# Patient Record
Sex: MALE | Race: BLACK OR AFRICAN AMERICAN | NOT HISPANIC OR LATINO | Employment: UNEMPLOYED | URBAN - METROPOLITAN AREA
[De-identification: names, ages, dates, MRNs, and addresses within clinical notes are randomized per-mention and may not be internally consistent; named-entity substitution may affect disease eponyms.]

---

## 2023-07-06 PROBLEM — M62.81 MUSCLE WEAKNESS OF UPPER EXTREMITY: Status: ACTIVE | Noted: 2023-07-06

## 2023-07-06 PROBLEM — I49.1 PAC (PREMATURE ATRIAL CONTRACTION): Status: ACTIVE | Noted: 2023-07-06

## 2023-07-06 PROBLEM — R53.83 FATIGUE: Status: ACTIVE | Noted: 2023-07-06

## 2023-07-06 PROBLEM — R25.1 TREMOR: Status: ACTIVE | Noted: 2023-07-06

## 2023-07-06 PROBLEM — R55 SYNCOPAL EPISODES: Status: ACTIVE | Noted: 2023-07-06

## 2023-07-06 PROBLEM — E55.9 VITAMIN D DEFICIENCY: Status: ACTIVE | Noted: 2023-07-06

## 2023-07-06 PROBLEM — K02.9 PAIN DUE TO DENTAL CARIES: Status: ACTIVE | Noted: 2023-07-06

## 2023-07-06 PROBLEM — R31.29 PERSISTENT MICROSCOPIC HEMATURIA: Status: ACTIVE | Noted: 2023-07-06

## 2023-07-06 PROBLEM — R42 DIZZINESS: Status: ACTIVE | Noted: 2023-07-06

## 2023-07-06 PROBLEM — G56.03 BILATERAL CARPAL TUNNEL SYNDROME: Status: ACTIVE | Noted: 2023-07-06

## 2023-07-06 PROBLEM — I73.9 PAD (PERIPHERAL ARTERY DISEASE) (CMS-HCC): Status: ACTIVE | Noted: 2023-07-06

## 2023-07-06 PROBLEM — R42 VERTIGO: Status: ACTIVE | Noted: 2023-07-06

## 2023-07-06 PROBLEM — F17.210 NICOTINE DEPENDENCE, CIGARETTES, UNCOMPLICATED: Status: ACTIVE | Noted: 2023-07-06

## 2023-07-06 PROBLEM — B19.20 HEPATITIS C: Status: ACTIVE | Noted: 2023-07-06

## 2023-07-06 PROBLEM — R06.02 SHORTNESS OF BREATH: Status: ACTIVE | Noted: 2023-07-06

## 2024-01-21 ENCOUNTER — HOSPITAL ENCOUNTER (OUTPATIENT)
Facility: HOSPITAL | Age: 64
Setting detail: OBSERVATION
Discharge: HOME | End: 2024-01-22
Attending: EMERGENCY MEDICINE | Admitting: EMERGENCY MEDICINE
Payer: COMMERCIAL

## 2024-01-21 ENCOUNTER — APPOINTMENT (OUTPATIENT)
Dept: RADIOLOGY | Facility: HOSPITAL | Age: 64
End: 2024-01-21
Payer: COMMERCIAL

## 2024-01-21 ENCOUNTER — CLINICAL SUPPORT (OUTPATIENT)
Dept: EMERGENCY MEDICINE | Facility: HOSPITAL | Age: 64
End: 2024-01-21
Payer: COMMERCIAL

## 2024-01-21 ENCOUNTER — APPOINTMENT (OUTPATIENT)
Dept: NEUROLOGY | Facility: HOSPITAL | Age: 64
End: 2024-01-21
Payer: COMMERCIAL

## 2024-01-21 DIAGNOSIS — R56.9 SEIZURE (MULTI): Primary | ICD-10-CM

## 2024-01-21 LAB
ALBUMIN SERPL BCP-MCNC: 3.9 G/DL (ref 3.4–5)
ALP SERPL-CCNC: 80 U/L (ref 33–136)
ALT SERPL W P-5'-P-CCNC: 25 U/L (ref 10–52)
AMPHETAMINES UR QL SCN: ABNORMAL
ANION GAP BLDV CALCULATED.4IONS-SCNC: 14 MMOL/L (ref 10–25)
ANION GAP BLDV CALCULATED.4IONS-SCNC: 9 MMOL/L (ref 10–25)
ANION GAP SERPL CALC-SCNC: 15 MMOL/L (ref 10–20)
APAP SERPL-MCNC: <10 UG/ML
APPEARANCE UR: CLEAR
AST SERPL W P-5'-P-CCNC: 18 U/L (ref 9–39)
BACTERIA #/AREA URNS AUTO: ABNORMAL /HPF
BARBITURATES UR QL SCN: ABNORMAL
BASE EXCESS BLDV CALC-SCNC: -2.6 MMOL/L (ref -2–3)
BASE EXCESS BLDV CALC-SCNC: -2.6 MMOL/L (ref -2–3)
BASOPHILS # BLD AUTO: 0.02 X10*3/UL (ref 0–0.1)
BASOPHILS NFR BLD AUTO: 0.4 %
BENZODIAZ UR QL SCN: ABNORMAL
BILIRUB SERPL-MCNC: 0.7 MG/DL (ref 0–1.2)
BILIRUB UR STRIP.AUTO-MCNC: NEGATIVE MG/DL
BODY TEMPERATURE: 37 DEGREES CELSIUS
BODY TEMPERATURE: 37 DEGREES CELSIUS
BUN SERPL-MCNC: 15 MG/DL (ref 6–23)
BZE UR QL SCN: ABNORMAL
CA-I BLDV-SCNC: 1.11 MMOL/L (ref 1.1–1.33)
CA-I BLDV-SCNC: 1.23 MMOL/L (ref 1.1–1.33)
CALCIUM SERPL-MCNC: 9.2 MG/DL (ref 8.6–10.6)
CANNABINOIDS UR QL SCN: ABNORMAL
CHLORIDE BLDV-SCNC: 103 MMOL/L (ref 98–107)
CHLORIDE BLDV-SCNC: 106 MMOL/L (ref 98–107)
CHLORIDE SERPL-SCNC: 106 MMOL/L (ref 98–107)
CO2 SERPL-SCNC: 23 MMOL/L (ref 21–32)
COLOR UR: YELLOW
CREAT SERPL-MCNC: 0.98 MG/DL (ref 0.5–1.3)
EGFRCR SERPLBLD CKD-EPI 2021: 86 ML/MIN/1.73M*2
EOSINOPHIL # BLD AUTO: 0.04 X10*3/UL (ref 0–0.7)
EOSINOPHIL NFR BLD AUTO: 0.7 %
ERYTHROCYTE [DISTWIDTH] IN BLOOD BY AUTOMATED COUNT: 13.7 % (ref 11.5–14.5)
ETHANOL SERPL-MCNC: <10 MG/DL
FENTANYL+NORFENTANYL UR QL SCN: ABNORMAL
GLUCOSE BLDV-MCNC: 83 MG/DL (ref 74–99)
GLUCOSE BLDV-MCNC: 97 MG/DL (ref 74–99)
GLUCOSE SERPL-MCNC: 82 MG/DL (ref 74–99)
GLUCOSE UR STRIP.AUTO-MCNC: NEGATIVE MG/DL
HCO3 BLDV-SCNC: 24.6 MMOL/L (ref 22–26)
HCO3 BLDV-SCNC: 24.6 MMOL/L (ref 22–26)
HCT VFR BLD AUTO: 49.6 % (ref 41–52)
HCT VFR BLD EST: 50 % (ref 41–52)
HCT VFR BLD EST: 52 % (ref 41–52)
HGB BLD-MCNC: 16.7 G/DL (ref 13.5–17.5)
HGB BLDV-MCNC: 16.5 G/DL (ref 13.5–17.5)
HGB BLDV-MCNC: 17.3 G/DL (ref 13.5–17.5)
IMM GRANULOCYTES # BLD AUTO: 0.02 X10*3/UL (ref 0–0.7)
IMM GRANULOCYTES NFR BLD AUTO: 0.4 % (ref 0–0.9)
INHALED O2 CONCENTRATION: 21 %
KETONES UR STRIP.AUTO-MCNC: NEGATIVE MG/DL
LACTATE BLDV-SCNC: 2 MMOL/L (ref 0.4–2)
LACTATE BLDV-SCNC: 4.6 MMOL/L (ref 0.4–2)
LEUKOCYTE ESTERASE UR QL STRIP.AUTO: NEGATIVE
LEVETIRACETAM SERPL-MCNC: <2 UG/ML (ref 10–40)
LYMPHOCYTES # BLD AUTO: 1.72 X10*3/UL (ref 1.2–4.8)
LYMPHOCYTES NFR BLD AUTO: 31.5 %
MCH RBC QN AUTO: 31.5 PG (ref 26–34)
MCHC RBC AUTO-ENTMCNC: 33.7 G/DL (ref 32–36)
MCV RBC AUTO: 94 FL (ref 80–100)
MONOCYTES # BLD AUTO: 0.69 X10*3/UL (ref 0.1–1)
MONOCYTES NFR BLD AUTO: 12.6 %
MUCOUS THREADS #/AREA URNS AUTO: ABNORMAL /LPF
NEUTROPHILS # BLD AUTO: 2.97 X10*3/UL (ref 1.2–7.7)
NEUTROPHILS NFR BLD AUTO: 54.4 %
NITRITE UR QL STRIP.AUTO: NEGATIVE
NRBC BLD-RTO: 0 /100 WBCS (ref 0–0)
OPIATES UR QL SCN: ABNORMAL
OXYCODONE+OXYMORPHONE UR QL SCN: ABNORMAL
OXYHGB MFR BLDV: 41.9 % (ref 45–75)
OXYHGB MFR BLDV: 48.5 % (ref 45–75)
PCO2 BLDV: 50 MM HG (ref 41–51)
PCO2 BLDV: 50 MM HG (ref 41–51)
PCP UR QL SCN: ABNORMAL
PH BLDV: 7.3 PH (ref 7.33–7.43)
PH BLDV: 7.3 PH (ref 7.33–7.43)
PH UR STRIP.AUTO: 5 [PH]
PLATELET # BLD AUTO: 200 X10*3/UL (ref 150–450)
PO2 BLDV: 33 MM HG (ref 35–45)
PO2 BLDV: 35 MM HG (ref 35–45)
POTASSIUM BLDV-SCNC: 3.8 MMOL/L (ref 3.5–5.3)
POTASSIUM BLDV-SCNC: 4.2 MMOL/L (ref 3.5–5.3)
POTASSIUM SERPL-SCNC: 4.1 MMOL/L (ref 3.5–5.3)
PROT SERPL-MCNC: 7.4 G/DL (ref 6.4–8.2)
PROT UR STRIP.AUTO-MCNC: ABNORMAL MG/DL
RBC # BLD AUTO: 5.3 X10*6/UL (ref 4.5–5.9)
RBC # UR STRIP.AUTO: ABNORMAL /UL
RBC #/AREA URNS AUTO: ABNORMAL /HPF
SALICYLATES SERPL-MCNC: <3 MG/DL
SAO2 % BLDV: 44 % (ref 45–75)
SAO2 % BLDV: 51 % (ref 45–75)
SODIUM BLDV-SCNC: 136 MMOL/L (ref 136–145)
SODIUM BLDV-SCNC: 137 MMOL/L (ref 136–145)
SODIUM SERPL-SCNC: 140 MMOL/L (ref 136–145)
SP GR UR STRIP.AUTO: 1.02
UROBILINOGEN UR STRIP.AUTO-MCNC: 2 MG/DL
WBC # BLD AUTO: 5.5 X10*3/UL (ref 4.4–11.3)
WBC #/AREA URNS AUTO: ABNORMAL /HPF

## 2024-01-21 PROCEDURE — 96361 HYDRATE IV INFUSION ADD-ON: CPT

## 2024-01-21 PROCEDURE — G0378 HOSPITAL OBSERVATION PER HR: HCPCS

## 2024-01-21 PROCEDURE — 84132 ASSAY OF SERUM POTASSIUM: CPT

## 2024-01-21 PROCEDURE — 96360 HYDRATION IV INFUSION INIT: CPT

## 2024-01-21 PROCEDURE — 2500000004 HC RX 250 GENERAL PHARMACY W/ HCPCS (ALT 636 FOR OP/ED): Mod: SE | Performed by: EMERGENCY MEDICINE

## 2024-01-21 PROCEDURE — 70450 CT HEAD/BRAIN W/O DYE: CPT | Performed by: STUDENT IN AN ORGANIZED HEALTH CARE EDUCATION/TRAINING PROGRAM

## 2024-01-21 PROCEDURE — 85025 COMPLETE CBC W/AUTO DIFF WBC: CPT | Performed by: EMERGENCY MEDICINE

## 2024-01-21 PROCEDURE — 71045 X-RAY EXAM CHEST 1 VIEW: CPT

## 2024-01-21 PROCEDURE — 99285 EMERGENCY DEPT VISIT HI MDM: CPT | Performed by: EMERGENCY MEDICINE

## 2024-01-21 PROCEDURE — 93005 ELECTROCARDIOGRAM TRACING: CPT

## 2024-01-21 PROCEDURE — 80177 DRUG SCRN QUAN LEVETIRACETAM: CPT | Performed by: EMERGENCY MEDICINE

## 2024-01-21 PROCEDURE — 80143 DRUG ASSAY ACETAMINOPHEN: CPT

## 2024-01-21 PROCEDURE — 70450 CT HEAD/BRAIN W/O DYE: CPT

## 2024-01-21 PROCEDURE — 72125 CT NECK SPINE W/O DYE: CPT | Performed by: STUDENT IN AN ORGANIZED HEALTH CARE EDUCATION/TRAINING PROGRAM

## 2024-01-21 PROCEDURE — 81001 URINALYSIS AUTO W/SCOPE: CPT | Mod: 59 | Performed by: EMERGENCY MEDICINE

## 2024-01-21 PROCEDURE — 36415 COLL VENOUS BLD VENIPUNCTURE: CPT

## 2024-01-21 PROCEDURE — 70553 MRI BRAIN STEM W/O & W/DYE: CPT | Performed by: RADIOLOGY

## 2024-01-21 PROCEDURE — 72125 CT NECK SPINE W/O DYE: CPT

## 2024-01-21 PROCEDURE — 71045 X-RAY EXAM CHEST 1 VIEW: CPT | Performed by: STUDENT IN AN ORGANIZED HEALTH CARE EDUCATION/TRAINING PROGRAM

## 2024-01-21 PROCEDURE — 95816 EEG AWAKE AND DROWSY: CPT

## 2024-01-21 PROCEDURE — 99221 1ST HOSP IP/OBS SF/LOW 40: CPT

## 2024-01-21 PROCEDURE — 80053 COMPREHEN METABOLIC PANEL: CPT | Performed by: EMERGENCY MEDICINE

## 2024-01-21 PROCEDURE — A9575 INJ GADOTERATE MEGLUMI 0.1ML: HCPCS | Mod: SE | Performed by: EMERGENCY MEDICINE

## 2024-01-21 PROCEDURE — 95816 EEG AWAKE AND DROWSY: CPT | Performed by: PSYCHIATRY & NEUROLOGY

## 2024-01-21 PROCEDURE — 2500000004 HC RX 250 GENERAL PHARMACY W/ HCPCS (ALT 636 FOR OP/ED): Mod: SE

## 2024-01-21 PROCEDURE — 36415 COLL VENOUS BLD VENIPUNCTURE: CPT | Performed by: EMERGENCY MEDICINE

## 2024-01-21 PROCEDURE — 80307 DRUG TEST PRSMV CHEM ANLYZR: CPT | Performed by: EMERGENCY MEDICINE

## 2024-01-21 PROCEDURE — 70553 MRI BRAIN STEM W/O & W/DYE: CPT

## 2024-01-21 PROCEDURE — 2550000001 HC RX 255 CONTRASTS: Mod: SE | Performed by: EMERGENCY MEDICINE

## 2024-01-21 RX ORDER — GADOTERATE MEGLUMINE 376.9 MG/ML
17 INJECTION INTRAVENOUS
Status: COMPLETED | OUTPATIENT
Start: 2024-01-21 | End: 2024-01-21

## 2024-01-21 RX ORDER — ACETAMINOPHEN 325 MG/1
650 TABLET ORAL ONCE
Status: COMPLETED | OUTPATIENT
Start: 2024-01-21 | End: 2024-01-21

## 2024-01-21 RX ADMIN — SODIUM CHLORIDE, SODIUM LACTATE, POTASSIUM CHLORIDE, AND CALCIUM CHLORIDE 1000 ML: 600; 310; 30; 20 INJECTION, SOLUTION INTRAVENOUS at 13:15

## 2024-01-21 RX ADMIN — GADOTERATE MEGLUMINE 17 ML: 376.9 INJECTION INTRAVENOUS at 23:27

## 2024-01-21 RX ADMIN — SODIUM CHLORIDE, POTASSIUM CHLORIDE, SODIUM LACTATE AND CALCIUM CHLORIDE 1000 ML: 600; 310; 30; 20 INJECTION, SOLUTION INTRAVENOUS at 15:18

## 2024-01-21 RX ADMIN — ACETAMINOPHEN 650 MG: 325 TABLET ORAL at 23:34

## 2024-01-21 ASSESSMENT — COLUMBIA-SUICIDE SEVERITY RATING SCALE - C-SSRS
6. HAVE YOU EVER DONE ANYTHING, STARTED TO DO ANYTHING, OR PREPARED TO DO ANYTHING TO END YOUR LIFE?: NO
1. IN THE PAST MONTH, HAVE YOU WISHED YOU WERE DEAD OR WISHED YOU COULD GO TO SLEEP AND NOT WAKE UP?: NO
2. HAVE YOU ACTUALLY HAD ANY THOUGHTS OF KILLING YOURSELF?: NO

## 2024-01-21 ASSESSMENT — PAIN DESCRIPTION - PAIN TYPE: TYPE: ACUTE PAIN

## 2024-01-21 ASSESSMENT — PAIN - FUNCTIONAL ASSESSMENT
PAIN_FUNCTIONAL_ASSESSMENT: 0-10
PAIN_FUNCTIONAL_ASSESSMENT: 0-10

## 2024-01-21 ASSESSMENT — PAIN DESCRIPTION - LOCATION: LOCATION: OTHER (COMMENT)

## 2024-01-21 ASSESSMENT — PAIN DESCRIPTION - PROGRESSION: CLINICAL_PROGRESSION: GRADUALLY WORSENING

## 2024-01-21 ASSESSMENT — PAIN SCALES - GENERAL: PAINLEVEL_OUTOF10: 8

## 2024-01-21 NOTE — H&P
History and Physical  [facility] EMERGENCY MEDICINE  Patient: Chidi Wright  MRN: [id number]  : 1960  Date of Evaluation: 2024  ED Provider: YUNIEL Venegas, ROX      Limitations to history: None  Independent Historian: Yes  External Records Reviewed: [facility] in December       Patient History:  Chidi Wright is a 63 y.o. male with past medical history significant for polysubstance abuse presented to the ED today after he apparently had a seizure and was found by his landlord on the floor and incontinence of urine.    He had a similar episode in December and was taken to [facility] and was subsequently discharged after he had a negative work up and was instructed to follow up with neurology but he never did.   His labs in the ED showed no acute abnormalities with an initial lactate of 4.6 with a repeat of 2.   He has moderate amount of blood in his urine with 1+ bacteria but no subjective complaints.   He was evaluated by neurology who recommended an MRI and an EEG (both ordered) before they make any recommendations.  He is therefore admitted to the CDU to complete his recommended work up.   Patient denied any drug or alcohol use however he is positive for marijuana and cocaine. Alcohol level of less than 10.     The acute evaluation included:  Orders Placed This Encounter   Procedures    XR chest 1 view    CT head wo IV contrast    CT cervical spine wo IV contrast    MR brain w and wo IV contrast    Comprehensive metabolic panel    CBC and Auto Differential    Levetiracetam    Extra Tubes    Blood Gas Lactic Acid, Venous    Acute Toxicology Panel, Blood    BLOOD GAS VENOUS FULL PANEL    Urinalysis with Reflex Microscopic    Drug Screen, Urine    Microscopic Only, Urine    Adult diet Regular    Inpatient consult to neurology    ECG 12 lead    EEG    Send to CDU    Initiate observation status       Upon admission to the Clinical Decision Unit, 24 @ 1635    Past History      Past Medical History:   Diagnosis Date    Personal history of pulmonary embolism 12/19/2019    History of pulmonary embolism     No past surgical history on file.  Social History     Socioeconomic History    Marital status: Single     Spouse name: Not on file    Number of children: Not on file    Years of education: Not on file    Highest education level: Not on file   Occupational History    Not on file   Tobacco Use    Smoking status: Not on file    Smokeless tobacco: Not on file   Substance and Sexual Activity    Alcohol use: Not on file    Drug use: Not on file    Sexual activity: Not on file   Other Topics Concern    Not on file   Social History Narrative    Not on file     Social Determinants of Health     Financial Resource Strain: Not on file   Food Insecurity: Not on file   Transportation Needs: Not on file   Physical Activity: Not on file   Stress: Not on file   Social Connections: Not on file   Intimate Partner Violence: Not on file   Housing Stability: Not on file         Medications/Allergies     Previous Medications    ACETAMINOPHEN (TYLENOL EXTRA STRENGTH) 500 MG TABLET    Take by mouth.    ASPIRIN 81 MG EC TABLET    Take 1 tablet (81 mg) by mouth once daily.    CHOLECALCIFEROL (VITAMIN D-3) 1,250 MCG (50,000 UNIT) CAPSULE    Take 1 capsule (50,000 Units) by mouth 1 (one) time per week.     No Known Allergies      Review of Systems  All systems reviewed and otherwise negative, except as stated above in HPI.      Physical Exam     Visit Vitals  /79   Pulse 80   Temp 34.6 °C (94.3 °F)   Resp 16   SpO2 99%       GENERAL:  The patient appears nourished and normally developed. Vital signs as documented.   HEENT:  Head normocephalic, atraumatic, EOMs intact, PERRLA, Mucous membranes moist. Nares patent without copious rhinorrhea.  No lymphadenopathy.  PULMONARY:  Lungs are clear to auscultation, without any respiratory distress. Able to speak full sentences, no accessory muscle use  CARDIAC:    Normal rate. No murmurs, rubs or gallops  ABDOMEN:  Soft, non-distended, non-tender, BS positive x 4 quadrants, No rebound or guarding, no peritoneal signs, no CVA tenderness, no masses or organomegaly  MUSCULOSKELETAL:   Able to ambulate, Non edematous, with no obvious deformities. Pulses intact distal  SKIN:   Good color, with no significant rashes.  No pallor.  NEURO:  No obvious neurological deficits, normal sensation and strength bilaterally.  Able to follow commands, NIH 0, CN 2-12 intact. No apparent injury due to his injury   Psych: Appropriate mood and affect    Consultants  1) neurology       Impression and Plan  In summary, Chidi Wright is admitted to the [facility] Clinical Decision Unit for seizure like activity. Dr. Stafford is the CDU admission attending.    This patient has been risk-stratified based on available history, physical exam, and related study findings. Admission to the observation status for further diagnosis/treatment/monitoring of seizure like activity  is warranted clinically. This extended period of observation is specifically required to determine the need for hospitalization.     The goals of this admission based on the patient’s clinical problem list are:  1) MRI of the brain   2) EEG  3) neurology recommendations    We will observe the patient for the following endpoints:   1) absent of seizures   2) completion of his work up     When met, appropriate disposition will be arranged.

## 2024-01-21 NOTE — CONSULTS
EPILEPSY CONSULT NOTE    History of Present Illness: Chidi Wright is a 63 y.o. with a PMHx of MISTY, one previsions seizure  who presents to [facility] for evaluation of seizure episode.     History was taken from patient, sister at bedside and chen on phone.   Patient last remembers sitting on couch and watching TV and does not remember events afterwards until he he was being taken away by the EMS.  Per landlord, she heard a thud in the other room and found patient to be unresponsive, having left-sided stiffening movements with loss of urinary continence.  She said patient's eyes rolled back into his eyes but was not able to notice any great gaze preference, tongue biting.  Patient was found by EMS to have a GCS of 5 o arrival with gradual improvement.  At baseline at time of interview.  Patient denies association with seizures and him stopping drinking alcohol says his last drink was 3 weeks ago.  Patient denies any head trauma, any headaches any visual symptoms any weakness or numbness.    Patient denies any prior diagnosis of epilepsy, states that he is not currently on any antiepileptic medications, otherwise denying any new focal neurologic deficits on my evaluation where he is now alert and oriented x 3 with no focal neurologic deficits.       Of note,  12/23: seizure admission at [facility] 63 male to the ED via EMS for suspected seizure versus syncopal episode. Patient reportedly was at [facility] according to EMS. Was moderately confused following arrival.  By patient's recollection he does not remember having a seizure he reports he has had no previous history of seizures he denies biting tongue.  CT head at that time did not reveal anything and patient was discharged with outpatient follow-up with neurology.  Patient was not able to schedule a follow-up appointment.    ROS: All systems reviewed and were negative except as above    Home Medications:    Current Outpatient Medications   Medication Instructions     acetaminophen (Tylenol Extra Strength) 500 mg tablet oral    aspirin 81 mg EC tablet 1 tablet, oral, Daily    cholecalciferol (Vitamin D-3) 1,250 mcg (50,000 unit) capsule 1 capsule, oral, Weekly        Past Medical History:    has a past medical history of Personal history of pulmonary embolism (12/19/2019).    Past Surgical History:    has no past surgical history on file.    Allergies:   No Known Allergies    Family History:   No family history on file.    Past Social History:   Alcohol:Smith hasnot consumed alcohol for 1 week - drinks a beer weekly  History of daily alcohol use.   20py smoking history      Vitals:   Temp:  [34.6 °C (94.3 °F)] 34.6 °C (94.3 °F)  Heart Rate:  [78-88] 78  Respirations:  [16] 16  BP: (137-158)/(68-89) 158/79    Physical Exam:   GENERAL APPEARANCE:  No distress, alert, interactive and cooperative.     MENTAL STATE:   Orientation was normal to time, place and person. Recent and remote memory was intact.  Attention span and concentration were normal.     CRANIAL NERVES:   CN 2   Visual fields full to confrontation.   CN 3, 4, 6   Pupils round, 4 mm in diameter, equally reactive to light. Lids symmetric; no ptosis. EOMs normal alignment, full range with normal saccades, pursuit and convergence.   No nystagmus.   CN 5   Facial sensation intact bilaterally.   CN 7   Normal and symmetric facial strength. Nasolabial folds symmetric.   CN 8   Hearing intact to conversation.  CN 9/10  Palate elevates symmetrically.   CN 11   Normal strength of shoulder shrug and neck turning.   CN 12   Tongue midline, with normal bulk and strength; no fasciculations.     MOTOR:   Muscle bulk and tone were normal in both upper and lower extremities.   No fasciculations, tremor or other abnormal movements were present.                         R          L  Deltoids        5          5  Biceps          5          5  Triceps          5          5  Wrist Flex      5          5  Wrist Ext       5          5    Hip  "Flex         5          5  Knee Flex      5          5  Knee Ext        5          5  Dorsiflex         5          5  Plantarflex      5          5    REFLEXES:                       R          L  BR:               2         2  Biceps:         2          2  Triceps:        2          2  Knee:            2          2  Ankle:          2          2    Babinski: toes downgoing to plantar stimulation. No clonus or other pathologic reflexes present.     SENSORY:   In both upper and lower extremities, sensation was intact to light touch    COORDINATION:    In both upper extremities, finger-nose-finger was intact without dysmetria or overshoot.   In both lower extremities, heel-to-shin was intact.        Labs:   Results from last 72 hours   Lab Units 01/21/24  1240   WBC AUTO x10*3/uL 5.5   HEMOGLOBIN g/dL 16.7   HEMATOCRIT % 49.6   PLATELETS AUTO x10*3/uL 200      Results from last 72 hours   Lab Units 01/21/24  1240   SODIUM mmol/L 140   POTASSIUM mmol/L 4.1   CHLORIDE mmol/L 106   CO2 mmol/L 23   BUN mg/dL 15   CREATININE mg/dL 0.98      Results from last 72 hours   Lab Units 01/21/24  1240   ALK PHOS U/L 80   AST U/L 18   ALT U/L 25   BILIRUBIN TOTAL mg/dL 0.7               No results found for: \"BNP\"    No results found for: \"GLUCOSEU\", \"BLOODU\", \"PROTUR\", \"NITRITEU\", \"LEUKOCYTESU\", \"WBCU\", \"RBCU\"      Imaging:  No MRI head results found for the past 14 days  CT head wo IV contrast    Result Date: 1/21/2024  Interpreted By:  Gage Yoon and Stephens Katherine STUDY: CT HEAD WO IV CONTRAST; CT CERVICAL SPINE WO IV CONTRAST;  1/21/2024 1:58 pm   INDICATION: Signs/Symptoms: found down, seizure.   COMPARISON: CT head 12/15/2019   ACCESSION NUMBER(S): [id number]; [id number]   ORDERING CLINICIAN: CHRISTIANO COVARRUBIAS   TECHNIQUE: Noncontrast axial CT scan of the head and cervical spine was performed. Angled reformats in brain and bone windows and coronal and sagittal reformats in brain windows were generated. The images " were reviewed in bone, brain, blood and soft tissue windows. Sagittal and coronal reconstructions of the cervical spine were generated in bone windows.   FINDINGS: Head:   CSF Spaces: The ventricles, sulci and basal cisterns are appropriate for the degree of volume loss. There is no extraaxial fluid collection.   Parenchyma: Parenchymal volume loss most pronounced in the frontal lobes, similar to previous. Patchy nonspecific low attenuation in the white matter is questionably worsened from previous and is likely secondary to chronic small vessel ischemic disease. The grey-white differentiation is intact. There is no mass effect or midline shift. No acute intracranial hemorrhage.   Calvarium: The calvarium is unremarkable.   Paranasal sinuses and mastoids: Changes of chronic paranasal sinus disease are again noted with associated nonspecific layering low attenuation fluid in the left sphenoid sinus and partial opacification of the posterior left nasal cavity and left ethmoid air cells. The mastoid air cells are clear.   Cervical Spine:   Alignment:  There is straightening of the normal cervical lordosis, the alignment is preserved.   Vertebrae and disc spaces:  No acute fracture.  Vertebral body height is preserved.  Hypertrophic degenerative changes at the atlantoaxial joint, the craniocervical junction is intact. Mild multilevel disc height loss with associated degenerative endplate changes and osteophyte formation. Multilevel disc bulges and facet and uncovertebral hypertrophic changes resulting in foraminal narrowing most pronounced at C5-6 on the left and spinal canal narrowing most pronounced at C3-4.   Soft tissues: No prevertebral soft tissue swelling.  Vascular calcifications are noted, limited evaluation of the soft tissues of the neck is otherwise unremarkable.       1. No acute intracranial pathology. 2. No fracture or subluxation of the cervical spine.   I personally reviewed the images/study and I agree  with Sarah Mckeon DO's (radiology resident) findings as stated. This study was interpreted at [facility], [place], [place].   MACRO: None.   Signed by: Gage Yoon 1/21/2024 2:19 PM Dictation workstation:   [id number]     Assessment  Chidi Wright is a 63 y.o.  a PMHx of alcohol abuse,   who presents to [facility] for seizure episode. Patient has no known trigger, says last drink was a week ago. Given two episode of new onset seizures at age>60, we need to rule out intracranial pathology.     4D Classification of the Paroxysmal Episodes:  Epileptic    Episodes semiology: lesley lepsis --> left tonic - > generalized tonic  Frequency: 2 times  History of Status Epilepticus: None  Localization: R. hemisphere  Etiology: unknown   Co-morbidities: None     Recommendations:  - Please get routine EEG  - Please order MRI Brain w/wo contrast  - Please get alcohol blood level  - Please page Neurology if results available.   - Hold off on any AEDs for now  - Will need outpatient follow up with Neurology      Clary Meza MD  Neurology PGY-2    -EEG normal appropriate to discharge  -Patient staffed with Epilepsy attending, would like to continue keppra 750mg BID.   -Please instruct patient not to drive, use power tools or operate heavy machinery, and should not be on ladders. Use the shower and not the bath. Likewise, refrain from any activity which could result in injury to themselves or others if they had a seizure or lost consciousness. These restrictions should continue until instructed by a doctor to do otherwise.   -Please arrange outpatient follow up with Neurology after discharge.    Paulette Avila MD  Department of Neurology, PGY-2  Epilepsy pager [phone]

## 2024-01-21 NOTE — ED PROVIDER NOTES
HPI   Chief Complaint   Patient presents with    Seizures       Patient is a 63-year-old male with history of polysubstance use disorder who presents after seizure-like activity.  He was seen last month at [facility] for similar presentation of witnessed seizure.  Seizure on this encounter was less than 3 minutes.  Another person in the household heard a side and came in to check on patient witnessing tonic-clonic seizure-like activity with loss of bladder control.  Patient is not on any antiepileptics at this time.  He denies alcohol or recreational drug use.  He is still slightly confused though oriented to person place and time.  No acute numbness tingling or weakness.  No fevers or chills.  No recent falls or trauma outside of the seizure.                          Missael Coma Scale Score: 14                  Patient History   Past Medical History:   Diagnosis Date    Personal history of pulmonary embolism 12/19/2019    History of pulmonary embolism     No past surgical history on file.  No family history on file.  Social History     Tobacco Use    Smoking status: Not on file    Smokeless tobacco: Not on file   Substance Use Topics    Alcohol use: Not on file    Drug use: Not on file       Physical Exam   ED Triage Vitals [01/21/24 1225]   Temp Heart Rate Respirations BP   34.6 °C (94.3 °F) 84 16 142/78      Pulse Ox Temp src Heart Rate Source Patient Position   96 % -- -- --      BP Location FiO2 (%)     -- --       Physical Exam  Vitals and nursing note reviewed.   Constitutional:       General: He is not in acute distress.     Appearance: He is well-developed and normal weight.   HENT:      Head: Normocephalic and atraumatic.      Mouth/Throat:      Mouth: Mucous membranes are moist.   Eyes:      Conjunctiva/sclera: Conjunctivae normal.      Pupils: Pupils are equal, round, and reactive to light.   Cardiovascular:      Rate and Rhythm: Normal rate and regular rhythm.      Heart sounds: No murmur heard.  Pulmonary:       Effort: Pulmonary effort is normal. No respiratory distress.      Breath sounds: Normal breath sounds.   Abdominal:      Palpations: Abdomen is soft.      Tenderness: There is no abdominal tenderness.   Musculoskeletal:         General: No swelling.      Cervical back: Neck supple.   Skin:     General: Skin is warm and dry.      Capillary Refill: Capillary refill takes less than 2 seconds.   Neurological:      Mental Status: He is alert and oriented to person, place, and time.      Cranial Nerves: No cranial nerve deficit.      Sensory: No sensory deficit.      Motor: No weakness.      Gait: Gait normal.   Psychiatric:         Mood and Affect: Mood normal.         Behavior: Behavior normal.         ED Course & MDM   ED Course as of 01/23/24 0820   Sun Jan 21, 2024   1452 Senior staffing note:  Patient is a 63-year-old male past medical history significant for polysubstance use disorder, previous presentations concerning for potential seizures who is presenting to the emergency department with a chief concern of altered mental status/seizure.  Patient was at his home when his landlord ordered a, patient was found with loss of urine incontinence urinary incontinence, altered with GCS of 5 and and gradual improvement.  Patient reports that he does not drink alcohol daily, has never had seizures when he stops drinking alcohol.  Patient states that he was previously told to follow-up with a neurologist however he did not.  Patient denies any prior diagnosis of epilepsy, states that he is not currently on any antiepileptic medications, otherwise denying any new focal neurologic deficits on my evaluation where he is now alert and oriented x 3 with no focal neurologic deficits.  Patient will benefit from epilepsy workup given that this is his second presentation with loss of consciousness.  I did advise the patient that he should not drive, operate heavy machinery, climb a ladder, bathe or swim in open bodies of water.   I otherwise agree with the residents history, physical examination, assessment and believe that if patient is cleared from neurologic standpoint he is appropriate for discharge. [BN]      ED Course User Index  [BN] Janusz Kowalski MD         Diagnoses as of 01/23/24 0820   Seizure (CMS/[facility])     Labs Reviewed   BLOOD GAS VENOUS FULL PANEL UNSOLICITED - Abnormal       Result Value    POCT pH, Venous 7.30 (*)     POCT pCO2, Venous 50      POCT pO2, Venous 35      POCT SO2, Venous 51      POCT Oxy Hemoglobin, Venous 48.5      POCT Hematocrit Calculated, Venous 52.0      POCT Sodium, Venous 137      POCT Potassium, Venous 4.2      POCT Chloride, Venous 103      POCT Ionized Calicum, Venous 1.23      POCT Glucose, Venous 83      POCT Lactate, Venous 4.6 (*)     POCT Base Excess, Venous -2.6 (*)     POCT HCO3 Calculated, Venous 24.6      POCT Hemoglobin, Venous 17.3      POCT Anion Gap, Venous 14.0      Patient Temperature 37.0     CBC WITH AUTO DIFFERENTIAL    WBC 5.5      nRBC 0.0      RBC 5.30      Hemoglobin 16.7      Hematocrit 49.6      MCV 94      MCH 31.5      MCHC 33.7      RDW 13.7      Platelets 200      Neutrophils % 54.4      Immature Granulocytes %, Automated 0.4      Lymphocytes % 31.5      Monocytes % 12.6      Eosinophils % 0.7      Basophils % 0.4      Neutrophils Absolute 2.97      Immature Granulocytes Absolute, Automated 0.02      Lymphocytes Absolute 1.72      Monocytes Absolute 0.69      Eosinophils Absolute 0.04      Basophils Absolute 0.02     COMPREHENSIVE METABOLIC PANEL   LEVETIRACETAM   BLOOD GAS LACTIC ACID, VENOUS       XR chest 1 view    (Results Pending)   CT head wo IV contrast    (Results Pending)   CT cervical spine wo IV contrast    (Results Pending)       Medical Decision Making  Patient is a 63-year-old male who presents for evaluation after a witnessed seizure.  He is vitally stable upon arrival with no tachycardia or fevers.  He is saturating well on room air and protecting his  airway.  He is slow to answer questions so ANO x 3 with no focal deficits.  Per EMS he was initially GCS of 5 with rapidly improving mental status.  Initial blood gas obtained is concerning for a lactic acidosis consistent with recent seizure.  Patient has had loss bladder control.  Has no previous diagnosis of epilepsy.  He denies drinking alcohol today or ever having had seizures after not drinking alcohol.  He denies any recent falls or traumas concerning for underlying intracranial hemorrhage causing seizure-like activities.  Labs were obtained and the patient revealing no significant electrolyte abnormalities renal or liver dysfunction.  No acute leukocytosis or anemia.  Patient's UDS is positive for cocaine and benzodiazepines.  Head and C-spine were obtained in the setting of patient having had a fall to the ground and to search for underlying structural cause of seizures new onset in his 60s.  CT head and C-spine negative for any large space-occupying mass hemorrhage or fracture of skull or C-spine.  Patient's mental status continues to improve and he is more interactive in conversation.  EKG obtained reveals no acute ischemia on independent interpretation no ST elevations or T wave inversions.  Chest x-ray reveals no signs of pneumonia consolidation or concerns for aspiration pneumonitis.  Given that this is patient's second presentation for witnessed seizure and has not been able to follow-up with a outpatient neurologist patient to be admitted to CDU for epilepsy workup.  Neuro epilepsy was consulted for this patient.  Patient is agreeable with plan for admission and all questions were answered.      Patient seen and discussed with Dr. Kowalski and Dr. Kerri Emery, DO  PGY-2 Emergency Medicine    Procedure  Procedures        ATTENDING ATTESTATION  63-year-old male presenting to the emergency department with a possible seizure.  EMS provide surrogate history on the patient's behalf.  EMS, bystanders  heard a thump, when they looked, the patient was found on the ground with shaking activity, unresponsive, loss of bladder, gradual improvement of his mental status.  This event was apparently self-limited.  Patient was transported to our facility for evaluation.  Record review reveals that patient had a very similar clinical event in December 2023, was seen and evaluated at [facility], unclear what the potential etiology was however, I am unable to ascertain a history of known seizure disorder, the patient denies that there is no history of him ever being prescribed antiepileptics.  The outside records at that time were reviewed, the patient had no gross electrolyte disturbance CBC was unremarkable, urine toxicology significant only for cocaine and marijuana otherwise lab evaluation was negative.  CT of the head was unremarkable.  Today the patient is awake and alert he is oriented to self and place, he is unaware of the year.  He appears slightly confused but in no distress.  The patient has symmetric strength and sensation in the upper and lower extremities his gaze is conjugate face is symmetric with good motor and sensory control and no focality that would suggest CVA.  There is no sign of any external traumatic injury.  Unremarkable cardiopulmonary exam.  Seizure precautions were ordered for the patient IV established labs obtained, he was placed on cardiopulmonary monitoring.  We will obtain labs, CT the patient's head.  This is now the patient's second unprovoked seizure, there are records that reveal that the patient has had history of periodic drinking but it is unclear if he has any underlying substance use disorder possible call withdrawal that may have precipitated the symptoms, there is no tremulousness of the tongue or fasciculations, no tachycardia no hypertension nothing to suggest physiologic evidence of active alcohol withdrawal.  Neuro c/s pending, s/o to oncoming provider    EKG  reviewed independently by me and agree with interpretation above.    Arnel Manning DO  [facility]    The patient was seen by the resident/fellow.  I have personally performed a substantive portion of the encounter.  I have seen and examined the patient; agree with the workup, evaluation, MDM, management and diagnosis.    I have reviewed all the nurses' notes and have confirmed their findings, and have incorporated those findings into this medical record.   The care plan has been discussed with the resident/fellow; I have reviewed the resident/fellow’s note and agree with the documented findings with the exception/addition of information listed above.  On my own examination I agree and incorporated in this document my own history, examination findings and clinical decision making.  All notation in this Addendum supersedes information presented by the resident or GAVIOTA as listed above.        Magaly Emery DO  Resident  01/25/24 2038

## 2024-01-21 NOTE — ED TRIAGE NOTES
Presents to ED for seizure. Bystander said they heard a thump on the ground and found pt. Per EMS, pt had foam around mouth and was incontinent of urine. Does not take medications at home. Side rails padded. Seizure precautions maintained. Suction at bedside. Chest rise and fall even and symmetrical. No signs of respiratory distress noted. DO (dietrich) at bedside at this time.

## 2024-01-22 VITALS
BODY MASS INDEX: 28.25 KG/M2 | DIASTOLIC BLOOD PRESSURE: 74 MMHG | WEIGHT: 180 LBS | HEIGHT: 67 IN | SYSTOLIC BLOOD PRESSURE: 138 MMHG | OXYGEN SATURATION: 96 % | TEMPERATURE: 97.7 F | RESPIRATION RATE: 14 BRPM | HEART RATE: 66 BPM

## 2024-01-22 LAB
ATRIAL RATE: 78 BPM
P AXIS: 63 DEGREES
P OFFSET: 197 MS
P ONSET: 137 MS
PR INTERVAL: 188 MS
Q ONSET: 231 MS
QRS COUNT: 13 BEATS
QRS DURATION: 86 MS
QT INTERVAL: 372 MS
QTC CALCULATION(BAZETT): 424 MS
QTC FREDERICIA: 406 MS
R AXIS: 43 DEGREES
T AXIS: 71 DEGREES
T OFFSET: 417 MS
VENTRICULAR RATE: 78 BPM

## 2024-01-22 PROCEDURE — G0378 HOSPITAL OBSERVATION PER HR: HCPCS

## 2024-01-22 RX ORDER — LEVETIRACETAM 750 MG/1
750 TABLET ORAL 2 TIMES DAILY
Qty: 180 TABLET | Refills: 0 | Status: SHIPPED | OUTPATIENT
Start: 2024-01-22 | End: 2024-04-21

## 2024-01-22 ASSESSMENT — PAIN SCALES - GENERAL: PAINLEVEL_OUTOF10: 0 - NO PAIN

## 2024-01-22 ASSESSMENT — PAIN - FUNCTIONAL ASSESSMENT: PAIN_FUNCTIONAL_ASSESSMENT: 0-10

## 2024-01-22 NOTE — ED NOTES
Pt given discharge handout with Rx and left with all personal belongings and two bus passes     Nixon Banegas, REINALDO  01/22/24 2894

## 2024-01-22 NOTE — DISCHARGE INSTRUCTIONS
Seizures, Adult ED    General Information  You came to the Emergency Department (ED) because you had a seizure. Seizures are waves of abnormal electrical activity in the brain. Seizures can make you pass out, or move or behave strangely. Most seizures last only a few seconds or minutes.  Some people who have repeated seizures have a condition called epilepsy. But not everyone who has a seizure has epilepsy. Problems such as low blood sugar or infection can also cause seizures. Other problems such as anxiety or fainting spells can cause events that look like seizures.  The doctors think that your seizure did not have a new or dangerous cause and it is safe for you to go home. After a seizure, you are likely to feel very tired.  What care is needed at home?  Call your regular doctor to let them know you were in the ED. Make a follow-up appointment if you were told to.  Take all your medicines exactly as ordered. Do not let your prescriptions run out.  Talk with your doctor or pharmacist before you take any new medicines. This includes over-the-counter medicines, vitamins, and supplements.  Do not drive until you get permission from your doctor. You should also be cleared by the department that issued your ’s license. For safety reasons, nearly all states and countries have laws about seizures and driving.  You should have someone with you when you go home. Be sure your family and friends know what to do if you have a seizure. They should:  Lay you gently on the ground.  Turn you on your side and support your head with a pillow or soft object.  Move away things that might hurt you if you hit them.  Be sure not to put anything in your mouth.  Keep track of how long the seizure lasts using a clock or watch. If it lasts more than 5 minutes or if you do not wake up after the seizure, they should call for emergency help.  Avoid alcohol.  Be sure to get enough sleep. Try to get at least 8 hours a night if  possible.  When do I need to get emergency help?  Call for an ambulance right away if:  You have a seizure that lasts more than 5 minutes or you do not wake up after a seizure.  You have repeated seizures over 24 hours.  You have signs of a stroke like sudden:  Numbness or weakness of the face, arm, or leg, especially on one side of the body.  Confusion or trouble speaking or understanding.  Trouble seeing in one or both eyes.  Trouble walking, dizziness, loss of balance or coordination.  Severe headache with no known cause.  Return to the ED if:  You start throwing up or have a very upset stomach.  You start to have a fever of 102.2°F (39°C) or higher.  When do I need to call the doctor?  You have epilepsy and your seizures are happening more often. For example, you used to have a seizure every couple of months and now you are having a seizure every few weeks.  You have new or worsening symptoms.

## 2024-01-22 NOTE — DISCHARGE SUMMARY
[facility] CLINICAL DECISION UNIT  DISCHARGE SUMMARY    Patient: Chidi Wright  MRN: [id number]  : 1960  Date of Evaluation: 2024  ED Provider: YUNIEL Gutierrez      Limitations to history: None  Independent Historian: Yes  External Records Reviewed: Yes      Subjective:  Chidi Wright is a 63-year-old-male has undergone comprehensive diagnostic evaluation and therapeutic management in accordance with the CDU guidelines for Seizure. Based on Mr. Wright's clinical response and diagnostic information during this period of observation, it has been determined that the patient will be discharged.     The acute evaluation included:  Orders Placed This Encounter   Procedures    XR chest 1 view    CT head wo IV contrast    CT cervical spine wo IV contrast    MR brain w and wo IV contrast    Comprehensive metabolic panel    CBC and Auto Differential    Levetiracetam    Extra Tubes    Blood Gas Lactic Acid, Venous    Acute Toxicology Panel, Blood    BLOOD GAS VENOUS FULL PANEL    Urinalysis with Reflex Microscopic    Drug Screen, Urine    Microscopic Only, Urine    Adult diet Regular    Inpatient consult to neurology    ECG 12 lead    EEG    Send to CDU    Initiate observation status       Results for orders placed or performed during the hospital encounter of 24   Comprehensive metabolic panel   Result Value Ref Range    Glucose 82 74 - 99 mg/dL    Sodium 140 136 - 145 mmol/L    Potassium 4.1 3.5 - 5.3 mmol/L    Chloride 106 98 - 107 mmol/L    Bicarbonate 23 21 - 32 mmol/L    Anion Gap 15 10 - 20 mmol/L    Urea Nitrogen 15 6 - 23 mg/dL    Creatinine 0.98 0.50 - 1.30 mg/dL    eGFR 86 >60 mL/min/1.73m*2    Calcium 9.2 8.6 - 10.6 mg/dL    Albumin 3.9 3.4 - 5.0 g/dL    Alkaline Phosphatase 80 33 - 136 U/L    Total Protein 7.4 6.4 - 8.2 g/dL    AST 18 9 - 39 U/L    Bilirubin, Total 0.7 0.0 - 1.2 mg/dL    ALT 25 10 - 52 U/L   CBC and Auto Differential   Result Value Ref Range    WBC 5.5 4.4 - 11.3 x10*3/uL     nRBC 0.0 0.0 - 0.0 /100 WBCs    RBC 5.30 4.50 - 5.90 x10*6/uL    Hemoglobin 16.7 13.5 - 17.5 g/dL    Hematocrit 49.6 41.0 - 52.0 %    MCV 94 80 - 100 fL    MCH 31.5 26.0 - 34.0 pg    MCHC 33.7 32.0 - 36.0 g/dL    RDW 13.7 11.5 - 14.5 %    Platelets 200 150 - 450 x10*3/uL    Neutrophils % 54.4 40.0 - 80.0 %    Immature Granulocytes %, Automated 0.4 0.0 - 0.9 %    Lymphocytes % 31.5 13.0 - 44.0 %    Monocytes % 12.6 2.0 - 10.0 %    Eosinophils % 0.7 0.0 - 6.0 %    Basophils % 0.4 0.0 - 2.0 %    Neutrophils Absolute 2.97 1.20 - 7.70 x10*3/uL    Immature Granulocytes Absolute, Automated 0.02 0.00 - 0.70 x10*3/uL    Lymphocytes Absolute 1.72 1.20 - 4.80 x10*3/uL    Monocytes Absolute 0.69 0.10 - 1.00 x10*3/uL    Eosinophils Absolute 0.04 0.00 - 0.70 x10*3/uL    Basophils Absolute 0.02 0.00 - 0.10 x10*3/uL   Levetiracetam   Result Value Ref Range    Keppra <2 (L) 10 - 40 ug/mL   Acute Toxicology Panel, Blood   Result Value Ref Range    Acetaminophen <10.0 10.0 - 30.0 ug/mL    Salicylate  <3 4 - 20 mg/dL    Alcohol <10 <=10 mg/dL   BLOOD GAS VENOUS FULL PANEL   Result Value Ref Range    POCT pH, Venous 7.30 (L) 7.33 - 7.43 pH    POCT pCO2, Venous 50 41 - 51 mm Hg    POCT pO2, Venous 33 (L) 35 - 45 mm Hg    POCT SO2, Venous 44 (L) 45 - 75 %    POCT Oxy Hemoglobin, Venous 41.9 (L) 45.0 - 75.0 %    POCT Hematocrit Calculated, Venous 50.0 41.0 - 52.0 %    POCT Sodium, Venous 136 136 - 145 mmol/L    POCT Potassium, Venous 3.8 3.5 - 5.3 mmol/L    POCT Chloride, Venous 106 98 - 107 mmol/L    POCT Ionized Calicum, Venous 1.11 1.10 - 1.33 mmol/L    POCT Glucose, Venous 97 74 - 99 mg/dL    POCT Lactate, Venous 2.0 0.4 - 2.0 mmol/L    POCT Base Excess, Venous -2.6 (L) -2.0 - 3.0 mmol/L    POCT HCO3 Calculated, Venous 24.6 22.0 - 26.0 mmol/L    POCT Hemoglobin, Venous 16.5 13.5 - 17.5 g/dL    POCT Anion Gap, Venous 9.0 (L) 10.0 - 25.0 mmol/L    Patient Temperature 37.0 degrees Celsius    FiO2 21 %   Urinalysis with Reflex  Microscopic   Result Value Ref Range    Color, Urine Yellow Straw, Yellow    Appearance, Urine Clear Clear    Specific Gravity, Urine 1.019 1.005 - 1.035    pH, Urine 5.0 5.0, 5.5, 6.0, 6.5, 7.0, 7.5, 8.0    Protein, Urine 100 (2+) (N) NEGATIVE mg/dL    Glucose, Urine NEGATIVE NEGATIVE mg/dL    Blood, Urine MODERATE (2+) (A) NEGATIVE    Ketones, Urine NEGATIVE NEGATIVE mg/dL    Bilirubin, Urine NEGATIVE NEGATIVE    Urobilinogen, Urine 2.0 (N) <2.0 mg/dL    Nitrite, Urine NEGATIVE NEGATIVE    Leukocyte Esterase, Urine NEGATIVE NEGATIVE   Drug Screen, Urine   Result Value Ref Range    Amphetamine Screen, Urine Presumptive Negative Presumptive Negative    Barbiturate Screen, Urine Presumptive Negative Presumptive Negative    Benzodiazepines Screen, Urine Presumptive Negative Presumptive Negative    Cannabinoid Screen, Urine Presumptive Positive (A) Presumptive Negative    Cocaine Metabolite Screen, Urine Presumptive Positive (A) Presumptive Negative    Fentanyl Screen, Urine Presumptive Negative Presumptive Negative    Opiate Screen, Urine Presumptive Negative Presumptive Negative    Oxycodone Screen, Urine Presumptive Negative Presumptive Negative    PCP Screen, Urine Presumptive Negative Presumptive Negative   Microscopic Only, Urine   Result Value Ref Range    WBC, Urine 1-5 1-5, NONE /HPF    RBC, Urine 6-10 (A) NONE, 1-2, 3-5 /HPF    Bacteria, Urine 1+ (A) NONE SEEN /HPF    Mucus, Urine 1+ Reference range not established. /LPF   Blood Gas Venous Full Panel Unsolicited   Result Value Ref Range    POCT pH, Venous 7.30 (L) 7.33 - 7.43 pH    POCT pCO2, Venous 50 41 - 51 mm Hg    POCT pO2, Venous 35 35 - 45 mm Hg    POCT SO2, Venous 51 45 - 75 %    POCT Oxy Hemoglobin, Venous 48.5 45.0 - 75.0 %    POCT Hematocrit Calculated, Venous 52.0 41.0 - 52.0 %    POCT Sodium, Venous 137 136 - 145 mmol/L    POCT Potassium, Venous 4.2 3.5 - 5.3 mmol/L    POCT Chloride, Venous 103 98 - 107 mmol/L    POCT Ionized Calicum, Venous 1.23  1.10 - 1.33 mmol/L    POCT Glucose, Venous 83 74 - 99 mg/dL    POCT Lactate, Venous 4.6 (HH) 0.4 - 2.0 mmol/L    POCT Base Excess, Venous -2.6 (L) -2.0 - 3.0 mmol/L    POCT HCO3 Calculated, Venous 24.6 22.0 - 26.0 mmol/L    POCT Hemoglobin, Venous 17.3 13.5 - 17.5 g/dL    POCT Anion Gap, Venous 14.0 10.0 - 25.0 mmol/L    Patient Temperature 37.0 degrees Celsius       Past History     Past Medical History:   Diagnosis Date    Personal history of pulmonary embolism 12/19/2019    History of pulmonary embolism     No past surgical history on file.  Social History     Socioeconomic History    Marital status: Single     Spouse name: Not on file    Number of children: Not on file    Years of education: Not on file    Highest education level: Not on file   Occupational History    Not on file   Tobacco Use    Smoking status: Not on file    Smokeless tobacco: Not on file   Substance and Sexual Activity    Alcohol use: Not on file    Drug use: Not on file    Sexual activity: Not on file   Other Topics Concern    Not on file   Social History Narrative    Not on file     Social Determinants of Health     Financial Resource Strain: Not on file   Food Insecurity: Not on file   Transportation Needs: Not on file   Physical Activity: Not on file   Stress: Not on file   Social Connections: Not on file   Intimate Partner Violence: Not on file   Housing Stability: Not on file         Medications/Allergies     Previous Medications    ACETAMINOPHEN (TYLENOL EXTRA STRENGTH) 500 MG TABLET    Take by mouth.    ASPIRIN 81 MG EC TABLET    Take 1 tablet (81 mg) by mouth once daily.    CHOLECALCIFEROL (VITAMIN D-3) 1,250 MCG (50,000 UNIT) CAPSULE    Take 1 capsule (50,000 Units) by mouth 1 (one) time per week.     No Known Allergies      Diagnostics reviewed by YUNIEL Gutierrez     Labs:  Results for orders placed or performed during the hospital encounter of 01/21/24   Comprehensive metabolic panel   Result Value Ref Range    Glucose  82 74 - 99 mg/dL    Sodium 140 136 - 145 mmol/L    Potassium 4.1 3.5 - 5.3 mmol/L    Chloride 106 98 - 107 mmol/L    Bicarbonate 23 21 - 32 mmol/L    Anion Gap 15 10 - 20 mmol/L    Urea Nitrogen 15 6 - 23 mg/dL    Creatinine 0.98 0.50 - 1.30 mg/dL    eGFR 86 >60 mL/min/1.73m*2    Calcium 9.2 8.6 - 10.6 mg/dL    Albumin 3.9 3.4 - 5.0 g/dL    Alkaline Phosphatase 80 33 - 136 U/L    Total Protein 7.4 6.4 - 8.2 g/dL    AST 18 9 - 39 U/L    Bilirubin, Total 0.7 0.0 - 1.2 mg/dL    ALT 25 10 - 52 U/L   CBC and Auto Differential   Result Value Ref Range    WBC 5.5 4.4 - 11.3 x10*3/uL    nRBC 0.0 0.0 - 0.0 /100 WBCs    RBC 5.30 4.50 - 5.90 x10*6/uL    Hemoglobin 16.7 13.5 - 17.5 g/dL    Hematocrit 49.6 41.0 - 52.0 %    MCV 94 80 - 100 fL    MCH 31.5 26.0 - 34.0 pg    MCHC 33.7 32.0 - 36.0 g/dL    RDW 13.7 11.5 - 14.5 %    Platelets 200 150 - 450 x10*3/uL    Neutrophils % 54.4 40.0 - 80.0 %    Immature Granulocytes %, Automated 0.4 0.0 - 0.9 %    Lymphocytes % 31.5 13.0 - 44.0 %    Monocytes % 12.6 2.0 - 10.0 %    Eosinophils % 0.7 0.0 - 6.0 %    Basophils % 0.4 0.0 - 2.0 %    Neutrophils Absolute 2.97 1.20 - 7.70 x10*3/uL    Immature Granulocytes Absolute, Automated 0.02 0.00 - 0.70 x10*3/uL    Lymphocytes Absolute 1.72 1.20 - 4.80 x10*3/uL    Monocytes Absolute 0.69 0.10 - 1.00 x10*3/uL    Eosinophils Absolute 0.04 0.00 - 0.70 x10*3/uL    Basophils Absolute 0.02 0.00 - 0.10 x10*3/uL   Levetiracetam   Result Value Ref Range    Keppra <2 (L) 10 - 40 ug/mL   Acute Toxicology Panel, Blood   Result Value Ref Range    Acetaminophen <10.0 10.0 - 30.0 ug/mL    Salicylate  <3 4 - 20 mg/dL    Alcohol <10 <=10 mg/dL   BLOOD GAS VENOUS FULL PANEL   Result Value Ref Range    POCT pH, Venous 7.30 (L) 7.33 - 7.43 pH    POCT pCO2, Venous 50 41 - 51 mm Hg    POCT pO2, Venous 33 (L) 35 - 45 mm Hg    POCT SO2, Venous 44 (L) 45 - 75 %    POCT Oxy Hemoglobin, Venous 41.9 (L) 45.0 - 75.0 %    POCT Hematocrit Calculated, Venous 50.0 41.0 - 52.0 %     POCT Sodium, Venous 136 136 - 145 mmol/L    POCT Potassium, Venous 3.8 3.5 - 5.3 mmol/L    POCT Chloride, Venous 106 98 - 107 mmol/L    POCT Ionized Calicum, Venous 1.11 1.10 - 1.33 mmol/L    POCT Glucose, Venous 97 74 - 99 mg/dL    POCT Lactate, Venous 2.0 0.4 - 2.0 mmol/L    POCT Base Excess, Venous -2.6 (L) -2.0 - 3.0 mmol/L    POCT HCO3 Calculated, Venous 24.6 22.0 - 26.0 mmol/L    POCT Hemoglobin, Venous 16.5 13.5 - 17.5 g/dL    POCT Anion Gap, Venous 9.0 (L) 10.0 - 25.0 mmol/L    Patient Temperature 37.0 degrees Celsius    FiO2 21 %   Urinalysis with Reflex Microscopic   Result Value Ref Range    Color, Urine Yellow Straw, Yellow    Appearance, Urine Clear Clear    Specific Gravity, Urine 1.019 1.005 - 1.035    pH, Urine 5.0 5.0, 5.5, 6.0, 6.5, 7.0, 7.5, 8.0    Protein, Urine 100 (2+) (N) NEGATIVE mg/dL    Glucose, Urine NEGATIVE NEGATIVE mg/dL    Blood, Urine MODERATE (2+) (A) NEGATIVE    Ketones, Urine NEGATIVE NEGATIVE mg/dL    Bilirubin, Urine NEGATIVE NEGATIVE    Urobilinogen, Urine 2.0 (N) <2.0 mg/dL    Nitrite, Urine NEGATIVE NEGATIVE    Leukocyte Esterase, Urine NEGATIVE NEGATIVE   Drug Screen, Urine   Result Value Ref Range    Amphetamine Screen, Urine Presumptive Negative Presumptive Negative    Barbiturate Screen, Urine Presumptive Negative Presumptive Negative    Benzodiazepines Screen, Urine Presumptive Negative Presumptive Negative    Cannabinoid Screen, Urine Presumptive Positive (A) Presumptive Negative    Cocaine Metabolite Screen, Urine Presumptive Positive (A) Presumptive Negative    Fentanyl Screen, Urine Presumptive Negative Presumptive Negative    Opiate Screen, Urine Presumptive Negative Presumptive Negative    Oxycodone Screen, Urine Presumptive Negative Presumptive Negative    PCP Screen, Urine Presumptive Negative Presumptive Negative   Microscopic Only, Urine   Result Value Ref Range    WBC, Urine 1-5 1-5, NONE /HPF    RBC, Urine 6-10 (A) NONE, 1-2, 3-5 /HPF    Bacteria, Urine 1+  (A) NONE SEEN /HPF    Mucus, Urine 1+ Reference range not established. /LPF   Blood Gas Venous Full Panel Unsolicited   Result Value Ref Range    POCT pH, Venous 7.30 (L) 7.33 - 7.43 pH    POCT pCO2, Venous 50 41 - 51 mm Hg    POCT pO2, Venous 35 35 - 45 mm Hg    POCT SO2, Venous 51 45 - 75 %    POCT Oxy Hemoglobin, Venous 48.5 45.0 - 75.0 %    POCT Hematocrit Calculated, Venous 52.0 41.0 - 52.0 %    POCT Sodium, Venous 137 136 - 145 mmol/L    POCT Potassium, Venous 4.2 3.5 - 5.3 mmol/L    POCT Chloride, Venous 103 98 - 107 mmol/L    POCT Ionized Calicum, Venous 1.23 1.10 - 1.33 mmol/L    POCT Glucose, Venous 83 74 - 99 mg/dL    POCT Lactate, Venous 4.6 (HH) 0.4 - 2.0 mmol/L    POCT Base Excess, Venous -2.6 (L) -2.0 - 3.0 mmol/L    POCT HCO3 Calculated, Venous 24.6 22.0 - 26.0 mmol/L    POCT Hemoglobin, Venous 17.3 13.5 - 17.5 g/dL    POCT Anion Gap, Venous 14.0 10.0 - 25.0 mmol/L    Patient Temperature 37.0 degrees Celsius     Radiographs:  MR brain w and wo IV contrast   Final Result   1. No evidence of acute infarct, intracranial hemorrhage or enhancing   mass lesion.   2. The paula and white matter have nonspecific hyperintensity which   may be secondary to degenerative, chronic microvascular ischemic,   hypertension or other etiologies.   3. Changes of chronic opacification of the left ethmoidal air cells,   left nasal cavity unless sphenoidal sinus, with patchy mucosal   enhancement and diffusion restriction. Findings likely represent   inspissated secretions versus fungal colonization, with likely acute   on chronic sinusitis. Correlate with physical examination and   symptoms.        I personally reviewed the images/study and I agree with the findings   as stated. This study was interpreted at [facility], [place], [place].        MACRO:   None        Signed by: Santiago Vargas 1/22/2024 9:08 AM   Dictation workstation:   [id number]      CT head wo IV contrast   Final Result  "  1. No acute intracranial pathology.   2. No fracture or subluxation of the cervical spine.        I personally reviewed the images/study and I agree with Sarah Mckeon DO's (radiology resident) findings as stated. This study   was interpreted at [place], [place].        MACRO:   None.        Signed by: Gage Yoon 1/21/2024 2:19 PM   Dictation workstation:   [id number]      CT cervical spine wo IV contrast   Final Result   1. No acute intracranial pathology.   2. No fracture or subluxation of the cervical spine.        I personally reviewed the images/study and I agree with Sarah Mckeon DO's (radiology resident) findings as stated. This study   was interpreted at [place], [place].        MACRO:   None.        Signed by: Gage Yoon 1/21/2024 2:19 PM   Dictation workstation:   [id number]      XR chest 1 view   Final Result   1.  No radiographic evidence of an acute cardiopulmonary process.        I personally reviewed the images/study and I agree with Sarah Mckeon DO's (radiology resident) findings as stated. This study   was interpreted at [place], [place].        MACRO:   None        Signed by: Hero Allen 1/21/2024 2:20 PM   Dictation workstation:   [id number]              Physical Exam     Visit Vitals  /74 (BP Location: Right arm, Patient Position: Lying)   Pulse 64   Temp 36.6 °C (97.8 °F) (Oral)   Resp 16   Ht 1.702 m (5' 7\")   Wt 81.6 kg (180 lb)   SpO2 100%   BMI 28.19 kg/m²   BSA 1.96 m²       Pertinent Physical Exam At Time of Discharge  Physical Exam  Vitals and nursing note reviewed.   Constitutional:       Appearance: Normal appearance. He is obese. He is not ill-appearing or toxic-appearing.   HENT:      Head: Normocephalic and atraumatic.      Right Ear: External ear normal.      Left Ear: External ear normal. "      Nose: Nose normal.      Mouth/Throat:      Mouth: Mucous membranes are moist.   Eyes:      Extraocular Movements: Extraocular movements intact.      Conjunctiva/sclera: Conjunctivae normal.      Pupils: Pupils are equal, round, and reactive to light.   Cardiovascular:      Rate and Rhythm: Normal rate and regular rhythm.      Pulses: Normal pulses.      Heart sounds: Normal heart sounds.   Pulmonary:      Effort: Pulmonary effort is normal.      Breath sounds: Normal breath sounds. No stridor. No wheezing, rhonchi or rales.   Abdominal:      General: Bowel sounds are normal.      Palpations: Abdomen is soft.   Genitourinary:     Comments: Exam deferred   Musculoskeletal:         General: No swelling, tenderness or signs of injury. Normal range of motion.      Cervical back: Normal range of motion and neck supple.   Skin:     General: Skin is warm and dry.   Neurological:      General: No focal deficit present.      Mental Status: He is alert and oriented to person, place, and time. Mental status is at baseline.      Sensory: No sensory deficit.      Motor: No weakness.   Psychiatric:         Mood and Affect: Mood normal.         Behavior: Behavior normal.       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  Restrictions for driving, operating heavy machinery and swimming until cleared by neurology.      Impression and Plan  In summary, Chidi Wright has been cared for according to the standard [facility] Clinical Decision Unit observation protocol for Seizure (CMS/HCC).  Reviewed outpatient/prior and current encounter documentation, laboratory studies, diagnostic results and interventions via EMR/Care Everywhere.  MRI brain shows no evidence of acute intracranial abnormalities, chronic microvascular changes noted.  Underwent an EEG which was normal, no epileptiform activity or lateralizing signs are  noted.  Bedside neuro epilepsy consult and evaluation, final recommendations as stated above.  Please see consult note for additional documentation and recommendations.  Patient reevaluated and updated with plan of care.  Patient feels well and comfortable going home.  Reviewed vital signs, stable and afebrile.  Physical exam as documented.  Respiratory and cardiac exams unrevealing.  No focal neurological deficits.  Sensations grossly intact throughout.  5/5 strength appreciated to bilateral upper and lower extremities.  Mr. Wright is in no apparent distress at this time and has remained stable throughout this CDU stay. Based on the patient's condition and test results, the patient will be discharged.  Home-going plan of care discussed with recommendations to follow-up with PCP/establish with a PCP in 1 week, neurology follow-up in 2 to 3 weeks, Keppra 750 mg twice daily, seizure precautions and restrictions as discussed, strict return precautions were explained. Patient acknowledged and amenable to plan.  Dr. Norris is the CDU disposition attending.    Date and Time of Disposition.   Discharge: 1/22/2024  Admit: 1/1/2024 at 4:39 PM    Discharge Diagnosis  Seizure (CMS/[facility])    Issues Requiring Follow-Up  None    Discharge Meds     Your medication list        ASK your doctor about these medications        Instructions Last Dose Given Next Dose Due   aspirin 81 mg EC tablet           cholecalciferol 50,000 unit capsule  Commonly known as: Vitamin D-3           Tylenol Extra Strength 500 mg tablet  Generic drug: acetaminophen                    Test Results Pending At Discharge  Pending Labs       Order Current Status    Blood Gas Lactic Acid, Venous In process            Outpatient Follow-Up  No future appointments.      Thomas Cowan III, MSN, CNP  ext [phone]  [facility]  Emergency Medicine/Clinical Decision Unit    Disclaimer: This note was dictated using a speech recognition  program.  While an attempt was made at proof reading to minimize errors, minor errors in transcription may be present

## 2024-01-22 NOTE — PROGRESS NOTES
Daily Progress Note  [facility] (formerly [facility]) CLINICAL DECISION    Subjective  Chidi Wright has been admitted to the CDU for 12 hours. Serial assessments of the patient's clinical progress include:  Patient had uneventful evening. He was given tylenol for pain resulting from when he bite his tongue during seizure-like episode earlier today, otherwise has no medical complaints or new/worsening concerns. On my evaluation, patient is a&ox3, awake and cooperative. He answers questions appropriately. Neurological exam is non-focal.   MRI brain was performed at 1030pm, pending final report.      Objective  PHYSICAL EXAM:   Vital signs reviewed and noted no distress. Afebrile.     General: Patient is conversant, non-toxic appearing. NAD.     Head: Normocephalic, Atraumatic.     Eyes: PERRL, EOMI without scleral icterus. Conjunctiva clear.     ENMT: Hearing grossly intact. MMM. Posterior oropharynx unremarkable. Normal phonation, no stridor, drooling or trismus.     Neck: Supple, full ROM without lymphadenopathy.     Cardiac: Regular rate & rhythm. No murmur, gallops, rubs or extrasystole.     Pulmonary: CTAB with normal effort and good aeration throughout. No accessory muscle usage. No adventitious breath sounds.      Abdomen: Soft, non-tender, nonsurgical. No masses. No rebound, rigidity or guarding. Normoactive BS     MSK: Full ROM intact. Symmetric muscle bulk without step-offs or gross deformity.     Vascular: Pulses full and equal. No cyanosis, clubbing or pitting LE edema. Capillary refill < 2s     Skin: WWP. Intact without rashes, lesions or discoloration. Turgor is good.     Neuro: CN II-XII intact. Awake, A&Ox3. Speech is fluent. No seizure-like activity or     tremors noted. No focal deficits noted.     Psychiatric: Mood and affect appropriate for situation.    Diagnostic Evaluation:     Labs    Results from last 72 hours   Lab Units 01/21/24  1240   WBC AUTO x10*3/uL 5.5   RBC AUTO x10*6/uL 5.30   HEMATOCRIT  % 49.6   MCV fL 94   MCH pg 31.5   RDW % 13.7   PLATELETS AUTO x10*3/uL 200   NEUTROS ABS x10*3/uL 2.97     Results from last 72 hours   Lab Units 01/21/24  1240   GLUCOSE mg/dL 82   SODIUM mmol/L 140   POTASSIUM mmol/L 4.1   CHLORIDE mmol/L 106   CO2 mmol/L 23   ANION GAP mmol/L 15   BUN mg/dL 15   CREATININE mg/dL 0.98   EGFR mL/min/1.73m*2 86   CALCIUM mg/dL 9.2      Results from last 72 hours   Lab Units 01/21/24  1240   ALK PHOS U/L 80   BILIRUBIN TOTAL mg/dL 0.7   PROTEIN TOTAL g/dL 7.4   ALT U/L 25   AST U/L 18     Results from last 72 hours   Lab Units 01/21/24  1240   LEVETIRACETAM ug/mL <2*      Results from last 72 hours   Lab Units 01/21/24  1444   AMPHETAMINE SCRN UR  Presumptive Negative   BARBITURATE SCRN UR  Presumptive Negative   BENZODIAZEPINE SCREEN, URINE  Presumptive Negative   FENTANYL URINE  Presumptive Negative   CANNABINOID SCRN UR  Presumptive Positive*   COCAINE SCREEN, URINE  Presumptive Positive*   OPIATE SCRN UR  Presumptive Negative   OXYCODONE SCRN UR  Presumptive Negative   PCP SCRN UR  Presumptive Negative      Results from last 72 hours   Lab Units 01/21/24  1430 01/21/24  1252   POCT PH, VENOUS pH 7.30* 7.30*   POCT PCO2, VENOUS mm Hg 50 50   POCT PO2, VENOUS mm Hg 33* 35   POCT SO2, VENOUS % 44* 51   POCT HEMATOCRIT CALCULATED, VENOUS % 50.0 52.0   POCT SODIUM, VENOUS mmol/L 136 137   POCT POTASSIUM, VENOUS mmol/L 3.8 4.2   POCT CHLORIDE, VENOUS mmol/L 106 103   POCT IONIZED CALCIUM, VENOUS mmol/L 1.11 1.23   POCT GLUCOSE, VENOUS mg/dL 97 83   POCT LACTATE, VENOUS mmol/L 2.0 4.6*   POCT BASE EXCESS, VENOUS mmol/L -2.6* -2.6*   POCT HCO3 CALCULATED, VENOUS mmol/L 24.6 24.6   POCT HEMOGLOBIN, VENOUS g/dL 16.5 17.3   POCT ANION GAP, VENOUS mmol/L 9.0* 14.0   FIO2 % 21  --          Urine  Results from last 72 hours   Lab Units 01/21/24  1444   COLOR U  Yellow   APPEARANCE U  Clear   PH U  5.0   PROTEIN U mg/dL 100 (2+)*   GLUCOSE U mg/dL NEGATIVE   BLOOD UR  MODERATE (2+)*   KETONES  UR mg/dL NEGATIVE   BILIRUBIN U  NEGATIVE   UROBILINOGEN UR mg/dL 2.0*   NITRITE U  NEGATIVE   LEUKOCYTES U  NEGATIVE      Results from last 72 hours   Lab Units 01/21/24  1444   WBC UR /HPF 1-5   RBC UR HPF /HPF 6-10*   MUCUS UR /LPF 1+       Imaging  MR brain w and wo IV contrast         CT head wo IV contrast   Final Result   1. No acute intracranial pathology.   2. No fracture or subluxation of the cervical spine.        I personally reviewed the images/study and I agree with Sarah Mckeon DO's (radiology resident) findings as stated. This study   was interpreted at [place], [place].        MACRO:   None.        Signed by: Gage Yoon 1/21/2024 2:19 PM   Dictation workstation:   [id number]      CT cervical spine wo IV contrast   Final Result   1. No acute intracranial pathology.   2. No fracture or subluxation of the cervical spine.        I personally reviewed the images/study and I agree with BIGG Tuckers (radiology resident) findings as stated. This study   was interpreted at [place], [place].        MACRO:   None.        Signed by: Gage Yoon 1/21/2024 2:19 PM   Dictation workstation:   [id number]      XR chest 1 view   Final Result   1.  No radiographic evidence of an acute cardiopulmonary process.        I personally reviewed the images/study and I agree with BIGG Tuckers (radiology resident) findings as stated. This study   was interpreted at [place], [place].        MACRO:   None        Signed by: Hero Allen 1/21/2024 2:20 PM   Dictation workstation:   [id number]            Medications:  Scheduled medications    Continuous medications    PRN medications     Assessment & Plan  Chidi Wright continues to be managed in accordance with the CDU clinical guidelines for seizures. An update of their clinical problem  list included:     1) Seizures    -- Initial lactate elevated at 4.6, repeat of 2.    -- MRI brain performed, final results pending    -- EEG    -- Neurology to follow-up on MRI + EEG results with final recommendations    -- Tylenol X 1 for mouth pain    2) Polysubstance abuse/SUDs    -- Utox (+) cocaine and marijuana, EtOH level < 10    -- Pt declined THRIVE     Loly Carrillo PA-C  [facility]

## 2024-01-24 ENCOUNTER — PATIENT OUTREACH (OUTPATIENT)
Dept: PRIMARY CARE | Facility: CLINIC | Age: 64
End: 2024-01-24
Payer: COMMERCIAL

## 2024-01-24 NOTE — PROGRESS NOTES
Discharge Facility:[facility]  Discharge Diagnosis:Seizure  Admission Date:1/21/24  Discharge Date: 1/22/24    PCP Appointment Date:Tasked to office  Specialist Appointment Date: Neurology  Hospital Encounter and Summary: Linked             2 attempts were made to reach patient to assess needs. No return call as of this note.   If patient schedules follow up within 14 days of discharge, visit is TCM billable.  Message sent to practice clinical pool to reach out to patient and schedule an appointment within 7-13 days from discharge date.    If patient meets criteria for moderately complex & has follow-up within 14 days-can bill [id number] for hospital follow up.   If patient meets criteria for highly complex & has follow-up visit within 7 days-can bill [phone] for hospital follow up    Amaris Brumfield LPN

## 2025-08-31 ENCOUNTER — PHARMACY VISIT (OUTPATIENT)
Dept: PHARMACY | Facility: CLINIC | Age: 65
End: 2025-08-31
Payer: COMMERCIAL

## 2025-08-31 ENCOUNTER — HOSPITAL ENCOUNTER (EMERGENCY)
Facility: HOSPITAL | Age: 65
Discharge: HOME | End: 2025-08-31
Payer: COMMERCIAL

## 2025-08-31 VITALS
HEIGHT: 67 IN | WEIGHT: 165 LBS | RESPIRATION RATE: 18 BRPM | BODY MASS INDEX: 25.9 KG/M2 | SYSTOLIC BLOOD PRESSURE: 146 MMHG | HEART RATE: 79 BPM | DIASTOLIC BLOOD PRESSURE: 70 MMHG | OXYGEN SATURATION: 95 % | TEMPERATURE: 97.7 F

## 2025-08-31 DIAGNOSIS — M79.605 PAIN IN LEFT LEG: Primary | ICD-10-CM

## 2025-08-31 LAB
ALBUMIN SERPL BCP-MCNC: 4.1 G/DL (ref 3.4–5)
ALP SERPL-CCNC: 93 U/L (ref 33–136)
ALT SERPL W P-5'-P-CCNC: 45 U/L (ref 10–52)
ANION GAP SERPL CALC-SCNC: 14 MMOL/L (ref 10–20)
APTT PPP: 31 SECONDS (ref 26–36)
AST SERPL W P-5'-P-CCNC: 48 U/L (ref 9–39)
BASOPHILS # BLD AUTO: 0.04 X10*3/UL (ref 0–0.1)
BASOPHILS NFR BLD AUTO: 0.6 %
BILIRUB SERPL-MCNC: 0.9 MG/DL (ref 0–1.2)
BUN SERPL-MCNC: 11 MG/DL (ref 6–23)
CALCIUM SERPL-MCNC: 9.6 MG/DL (ref 8.6–10.6)
CHLORIDE SERPL-SCNC: 102 MMOL/L (ref 98–107)
CO2 SERPL-SCNC: 24 MMOL/L (ref 21–32)
CREAT SERPL-MCNC: 0.69 MG/DL (ref 0.5–1.3)
EGFRCR SERPLBLD CKD-EPI 2021: >90 ML/MIN/1.73M*2
EOSINOPHIL # BLD AUTO: 0.05 X10*3/UL (ref 0–0.7)
EOSINOPHIL NFR BLD AUTO: 0.7 %
ERYTHROCYTE [DISTWIDTH] IN BLOOD BY AUTOMATED COUNT: 13.4 % (ref 11.5–14.5)
GLUCOSE SERPL-MCNC: 93 MG/DL (ref 74–99)
HCT VFR BLD AUTO: 53.9 % (ref 41–52)
HGB BLD-MCNC: 17.8 G/DL (ref 13.5–17.5)
IMM GRANULOCYTES # BLD AUTO: 0.02 X10*3/UL (ref 0–0.7)
IMM GRANULOCYTES NFR BLD AUTO: 0.3 % (ref 0–0.9)
INR PPP: 1.1 (ref 0.9–1.1)
LYMPHOCYTES # BLD AUTO: 2.69 X10*3/UL (ref 1.2–4.8)
LYMPHOCYTES NFR BLD AUTO: 37.7 %
MCH RBC QN AUTO: 31.1 PG (ref 26–34)
MCHC RBC AUTO-ENTMCNC: 33 G/DL (ref 32–36)
MCV RBC AUTO: 94 FL (ref 80–100)
MONOCYTES # BLD AUTO: 0.98 X10*3/UL (ref 0.1–1)
MONOCYTES NFR BLD AUTO: 13.7 %
NEUTROPHILS # BLD AUTO: 3.36 X10*3/UL (ref 1.2–7.7)
NEUTROPHILS NFR BLD AUTO: 47 %
NRBC BLD-RTO: 0 /100 WBCS (ref 0–0)
PLATELET # BLD AUTO: 212 X10*3/UL (ref 150–450)
POTASSIUM SERPL-SCNC: 4.1 MMOL/L (ref 3.5–5.3)
PROT SERPL-MCNC: 8.6 G/DL (ref 6.4–8.2)
PROTHROMBIN TIME: 11.7 SECONDS (ref 9.8–12.4)
RBC # BLD AUTO: 5.73 X10*6/UL (ref 4.5–5.9)
SODIUM SERPL-SCNC: 136 MMOL/L (ref 136–145)
WBC # BLD AUTO: 7.1 X10*3/UL (ref 4.4–11.3)

## 2025-08-31 PROCEDURE — 99284 EMERGENCY DEPT VISIT MOD MDM: CPT | Performed by: NURSE PRACTITIONER

## 2025-08-31 PROCEDURE — 36415 COLL VENOUS BLD VENIPUNCTURE: CPT | Performed by: EMERGENCY MEDICINE

## 2025-08-31 PROCEDURE — 85730 THROMBOPLASTIN TIME PARTIAL: CPT | Performed by: EMERGENCY MEDICINE

## 2025-08-31 PROCEDURE — 80053 COMPREHEN METABOLIC PANEL: CPT | Performed by: EMERGENCY MEDICINE

## 2025-08-31 PROCEDURE — 99283 EMERGENCY DEPT VISIT LOW MDM: CPT

## 2025-08-31 PROCEDURE — 85025 COMPLETE CBC W/AUTO DIFF WBC: CPT | Performed by: EMERGENCY MEDICINE

## 2025-08-31 RX ORDER — DICLOFENAC SODIUM 10 MG/G
4 GEL TOPICAL 4 TIMES DAILY
Qty: 100 G | Refills: 0 | Status: SHIPPED | OUTPATIENT
Start: 2025-08-31

## 2025-08-31 RX ORDER — NAPROXEN 500 MG/1
500 TABLET ORAL
Qty: 20 TABLET | Refills: 0 | Status: SHIPPED | OUTPATIENT
Start: 2025-08-31 | End: 2025-09-10

## 2025-08-31 RX ORDER — CYCLOBENZAPRINE HCL 10 MG
10 TABLET ORAL 2 TIMES DAILY PRN
Qty: 14 TABLET | Refills: 0 | Status: SHIPPED | OUTPATIENT
Start: 2025-08-31 | End: 2025-09-07

## 2025-08-31 ASSESSMENT — PAIN - FUNCTIONAL ASSESSMENT: PAIN_FUNCTIONAL_ASSESSMENT: 0-10

## 2025-08-31 ASSESSMENT — LIFESTYLE VARIABLES
EVER FELT BAD OR GUILTY ABOUT YOUR DRINKING: NO
TOTAL SCORE: 0
EVER HAD A DRINK FIRST THING IN THE MORNING TO STEADY YOUR NERVES TO GET RID OF A HANGOVER: NO
HAVE YOU EVER FELT YOU SHOULD CUT DOWN ON YOUR DRINKING: NO
HAVE PEOPLE ANNOYED YOU BY CRITICIZING YOUR DRINKING: NO

## 2025-08-31 ASSESSMENT — PAIN DESCRIPTION - ORIENTATION: ORIENTATION: LEFT

## 2025-08-31 ASSESSMENT — PAIN DESCRIPTION - LOCATION: LOCATION: LEG

## 2025-08-31 ASSESSMENT — PAIN SCALES - GENERAL
PAINLEVEL_OUTOF10: 8
PAINLEVEL_OUTOF10: 2

## 2025-08-31 ASSESSMENT — PAIN DESCRIPTION - PROGRESSION: CLINICAL_PROGRESSION: NOT CHANGED
